# Patient Record
(demographics unavailable — no encounter records)

---

## 2024-10-17 NOTE — PHYSICAL EXAM
[Ankle Swelling (On Exam)] : not present [Varicose Veins Of Lower Extremities] : not present [Delayed in the Right Toes] : capillary refills normal in right toes [Delayed in the Left Toes] : capillary refills normal in the left toes [2+] : left foot dorsalis pedis 2+ [No Joint Swelling] : no joint swelling [Skin Color & Pigmentation] : normal skin color and pigmentation [Skin Turgor] : normal skin turgor [] : no rash [Skin Lesions] : no skin lesions [Foot Ulcer] : no foot ulcer [Skin Induration] : no skin induration [Sensation] : the sensory exam was normal to light touch and pinprick [No Focal Deficits] : no focal deficits [Deep Tendon Reflexes (DTR)] : deep tendon reflexes were 2+ and symmetric [Motor Exam] : the motor exam was normal [General Appearance - Alert] : alert [Oriented To Time, Place, And Person] : oriented to person, place, and time

## 2024-12-05 NOTE — REVIEW OF SYSTEMS
[NI] : Endocrine [Nl] : Hematologic/Lymphatic [Change in Activity] : no change in activity [Fever Above 102] : no fever [Malaise] : no malaise [Rash] : no rash [Cough] : no cough [Vomiting] : no vomiting

## 2024-12-05 NOTE — DATA REVIEWED
[de-identified] : My interpretation and review of images taken today, 12/04/2024, in office:  X-rays of the left tibia reveal near complete consolidation of non-lytic lesion to the proximal tibia that was consistent with NOF, when evaluated on prior images

## 2024-12-05 NOTE — PHYSICAL EXAM
[FreeTextEntry1] : Healthy appearing 14 year-old child. Awake, alert, in no acute distress. Pleasant and cooperative.  Eyes are clear with no sclera abnormalities. External ears, nose and mouth are clear.  Good respiratory effort with no audible wheezing without use of a stethoscope. Ambulates independently with no evidence of antalgia. Good coordination and balance. Able to get on and off exam table without difficulty.   Spine: Inspection of the skin reveals no cafe au lait spots or large birth marks. From behind, patient is well centered with head and shoulders appropriately aligned with pelvis.  Shoulders are even with no significant scapula or flank asymmetry. Spine is grossly midline and straight. On Jayce's Forward Bend, there is very mild rotation noted in the trunk. NTTP over spinous processes and paraspinal musculature. Full range of motion at cervical, thoracic and lumbar spine with no pain or difficulty. Very mild pelvic obliquity. No LLD  Lower Extremities: Skin is clean and intact. Good overall alignment of lower extremities. No swelling, erythema, or ecchymosis. No lymphedema. Grossly non tender to palpation over LE Full ROM bilateral hips/knees/ankles. SILT, 5/5 strength EHL/FHL/ TA/GS DP 2+, Brisk cap refill <2 seconds TFA + 20 degrees bilaterally Achilles' tightness noted with DF to neutral with knees in extension No lymphedema

## 2024-12-05 NOTE — DATA REVIEWED
[de-identified] : My interpretation and review of images taken today, 12/04/2024, in office:  X-rays of the left tibia reveal near complete consolidation of non-lytic lesion to the proximal tibia that was consistent with NOF, when evaluated on prior images

## 2024-12-05 NOTE — REASON FOR VISIT
[Follow Up] : a follow up visit [Patient] : patient [Father] : father [FreeTextEntry1] : left tibia bone lesion f/u

## 2024-12-05 NOTE — ASSESSMENT
[FreeTextEntry1] : Meche is a 14 year old male with resolved L proximal tibia NOF, out toeing gait coming from mild external tibial torsion and Achilles' tightness and clinical mild spinal asymmetry.  The history was obtained today from the child and parent; given the patient's age, the history was unreliable and the parent was used as an independent historian.  In regard to the left tibial lesion previously noted back in 2020 and 2021, the lesion has almost completely resolved.  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  I have no further concerns regarding the tibia at this time.  We also discussed his clinical exam and I explained that he has very mild external tibial torsion as well as mild Achilles tightness that are both contributing to his external foot progression when walking.  My recommendation was to work on heel cord stretches at home and demonstrated some exercises for child today in office.  He is also able to cognitively change his gait when he is paying attention.  I explained that he may continue to do this as it seems to help improve his gait.  Lastly, we discussed his spinal asymmetry.  I offered to obtain x-rays today in office to assess for a potential spinal asymmetry versus mild scoliosis.  The family was unsure of their insurance coverage and instead given his mild clinical presentation, option to follow-up again with your pediatrician was offered.  I explained that if there is any change in his clinical exam with pediatrician, he may return at that time or we can consider getting imaging to help us better understand if there is any curvature present.  If the family has any concerns with the change in his appearance or posture, they may return sooner.  He will follow-up with us on an as-needed basis. This plan was discussed with family and all questions and concerns were addressed today.  I, Miranda Locke PA-C, have acted as a scribe and documented the above for Dr. Kimball  The above documentation completed by the scribe is an accurate record of both my words and actions.

## 2024-12-05 NOTE — HISTORY OF PRESENT ILLNESS
[FreeTextEntry1] : Meche is a 14-year-old boy who was last seen in our office on 8/25/21. He had had an incidental NOF finding of the proximal left tibia after evaluation of right knee pain in 11/2019. 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 Since last visit, he has been doing well with no complaints of pain, numbness or limitations. No fever, chills, weight loss or night sweats. No alleviating or aggravating factors as he is asymptomatic. He admits he did not follow up as recommended and returns today mainly to check in on the proximal tibial lesion he had. He does also mention concern for out toeing gait. He has no pain in the legs or difficulty with walking, but noticed his feet point outward a bit. He also mentioned that his PCP had noticed a mild pelvic obliquity and wanted to mention it for today's visit. No family history for scoliosis. No back pain. Here for repeat x-rays to monitor the lesion of left tibia and evaluate out toeing gait.